# Patient Record
Sex: FEMALE | Race: WHITE | HISPANIC OR LATINO | Employment: FULL TIME | URBAN - METROPOLITAN AREA
[De-identification: names, ages, dates, MRNs, and addresses within clinical notes are randomized per-mention and may not be internally consistent; named-entity substitution may affect disease eponyms.]

---

## 2024-03-26 ENCOUNTER — PHARMACY VISIT (OUTPATIENT)
Dept: PHARMACY | Facility: MEDICAL CENTER | Age: 29
End: 2024-03-26
Payer: COMMERCIAL

## 2024-03-26 ENCOUNTER — APPOINTMENT (OUTPATIENT)
Dept: RADIOLOGY | Facility: MEDICAL CENTER | Age: 29
End: 2024-03-26
Attending: EMERGENCY MEDICINE
Payer: MEDICAID

## 2024-03-26 ENCOUNTER — HOSPITAL ENCOUNTER (EMERGENCY)
Facility: MEDICAL CENTER | Age: 29
End: 2024-03-26
Attending: EMERGENCY MEDICINE
Payer: MEDICAID

## 2024-03-26 VITALS
TEMPERATURE: 98.6 F | DIASTOLIC BLOOD PRESSURE: 66 MMHG | RESPIRATION RATE: 18 BRPM | SYSTOLIC BLOOD PRESSURE: 108 MMHG | OXYGEN SATURATION: 96 % | WEIGHT: 164.46 LBS | HEART RATE: 87 BPM

## 2024-03-26 DIAGNOSIS — N76.0 BACTERIAL VAGINOSIS: ICD-10-CM

## 2024-03-26 DIAGNOSIS — B96.89 BACTERIAL VAGINOSIS: ICD-10-CM

## 2024-03-26 DIAGNOSIS — B37.31 VAGINAL CANDIDA: ICD-10-CM

## 2024-03-26 DIAGNOSIS — O20.0 THREATENED MISCARRIAGE IN EARLY PREGNANCY: ICD-10-CM

## 2024-03-26 LAB
ALBUMIN SERPL BCP-MCNC: 4.4 G/DL (ref 3.2–4.9)
ALBUMIN/GLOB SERPL: 1.7 G/DL
ALP SERPL-CCNC: 59 U/L (ref 30–99)
ALT SERPL-CCNC: 87 U/L (ref 2–50)
ANION GAP SERPL CALC-SCNC: 12 MMOL/L (ref 7–16)
APPEARANCE UR: CLEAR
AST SERPL-CCNC: 53 U/L (ref 12–45)
B-HCG SERPL-ACNC: 7889 MIU/ML (ref 0–5)
BACTERIA #/AREA URNS HPF: ABNORMAL /HPF
BASOPHILS # BLD AUTO: 0.7 % (ref 0–1.8)
BASOPHILS # BLD: 0.04 K/UL (ref 0–0.12)
BILIRUB SERPL-MCNC: 0.4 MG/DL (ref 0.1–1.5)
BILIRUB UR QL STRIP.AUTO: NEGATIVE
BUN SERPL-MCNC: 11 MG/DL (ref 8–22)
C TRACH DNA GENITAL QL NAA+PROBE: NEGATIVE
CALCIUM ALBUM COR SERPL-MCNC: 9 MG/DL (ref 8.5–10.5)
CALCIUM SERPL-MCNC: 9.3 MG/DL (ref 8.5–10.5)
CANDIDA DNA VAG QL PROBE+SIG AMP: POSITIVE
CHLORIDE SERPL-SCNC: 107 MMOL/L (ref 96–112)
CO2 SERPL-SCNC: 20 MMOL/L (ref 20–33)
COLOR UR: YELLOW
CREAT SERPL-MCNC: 0.58 MG/DL (ref 0.5–1.4)
EOSINOPHIL # BLD AUTO: 0.13 K/UL (ref 0–0.51)
EOSINOPHIL NFR BLD: 2.3 % (ref 0–6.9)
EPI CELLS #/AREA URNS HPF: ABNORMAL /HPF
ERYTHROCYTE [DISTWIDTH] IN BLOOD BY AUTOMATED COUNT: 39.4 FL (ref 35.9–50)
G VAGINALIS DNA VAG QL PROBE+SIG AMP: POSITIVE
GFR SERPLBLD CREATININE-BSD FMLA CKD-EPI: 125 ML/MIN/1.73 M 2
GLOBULIN SER CALC-MCNC: 2.6 G/DL (ref 1.9–3.5)
GLUCOSE SERPL-MCNC: 79 MG/DL (ref 65–99)
GLUCOSE UR STRIP.AUTO-MCNC: NEGATIVE MG/DL
HCT VFR BLD AUTO: 38.3 % (ref 37–47)
HGB BLD-MCNC: 13.6 G/DL (ref 12–16)
HYALINE CASTS #/AREA URNS LPF: ABNORMAL /LPF
IMM GRANULOCYTES # BLD AUTO: 0.02 K/UL (ref 0–0.11)
IMM GRANULOCYTES NFR BLD AUTO: 0.3 % (ref 0–0.9)
KETONES UR STRIP.AUTO-MCNC: NEGATIVE MG/DL
LEUKOCYTE ESTERASE UR QL STRIP.AUTO: ABNORMAL
LIPASE SERPL-CCNC: 41 U/L (ref 11–82)
LYMPHOCYTES # BLD AUTO: 1.62 K/UL (ref 1–4.8)
LYMPHOCYTES NFR BLD: 28.3 % (ref 22–41)
MCH RBC QN AUTO: 32.1 PG (ref 27–33)
MCHC RBC AUTO-ENTMCNC: 35.5 G/DL (ref 32.2–35.5)
MCV RBC AUTO: 90.3 FL (ref 81.4–97.8)
MICRO URNS: ABNORMAL
MONOCYTES # BLD AUTO: 0.57 K/UL (ref 0–0.85)
MONOCYTES NFR BLD AUTO: 10 % (ref 0–13.4)
N GONORRHOEA DNA GENITAL QL NAA+PROBE: NEGATIVE
NEUTROPHILS # BLD AUTO: 3.34 K/UL (ref 1.82–7.42)
NEUTROPHILS NFR BLD: 58.4 % (ref 44–72)
NITRITE UR QL STRIP.AUTO: NEGATIVE
NRBC # BLD AUTO: 0 K/UL
NRBC BLD-RTO: 0 /100 WBC (ref 0–0.2)
NUMBER OF RH DOSES IND 8505RD: NORMAL
PH UR STRIP.AUTO: 5 [PH] (ref 5–8)
PLATELET # BLD AUTO: 247 K/UL (ref 164–446)
PMV BLD AUTO: 9.7 FL (ref 9–12.9)
POTASSIUM SERPL-SCNC: 3.7 MMOL/L (ref 3.6–5.5)
PROT SERPL-MCNC: 7 G/DL (ref 6–8.2)
PROT UR QL STRIP: NEGATIVE MG/DL
RBC # BLD AUTO: 4.24 M/UL (ref 4.2–5.4)
RBC # URNS HPF: ABNORMAL /HPF
RBC UR QL AUTO: ABNORMAL
RH BLD: NORMAL
SODIUM SERPL-SCNC: 139 MMOL/L (ref 135–145)
SP GR UR STRIP.AUTO: 1.02
SPECIMEN SOURCE: NORMAL
T PALLIDUM AB SER QL IA: NORMAL
T VAGINALIS DNA VAG QL PROBE+SIG AMP: NEGATIVE
UROBILINOGEN UR STRIP.AUTO-MCNC: 0.2 MG/DL
WBC # BLD AUTO: 5.7 K/UL (ref 4.8–10.8)
WBC #/AREA URNS HPF: ABNORMAL /HPF

## 2024-03-26 PROCEDURE — 87510 GARDNER VAG DNA DIR PROBE: CPT

## 2024-03-26 PROCEDURE — 86780 TREPONEMA PALLIDUM: CPT

## 2024-03-26 PROCEDURE — 87491 CHLMYD TRACH DNA AMP PROBE: CPT

## 2024-03-26 PROCEDURE — 99284 EMERGENCY DEPT VISIT MOD MDM: CPT | Mod: EDC

## 2024-03-26 PROCEDURE — 85025 COMPLETE CBC W/AUTO DIFF WBC: CPT

## 2024-03-26 PROCEDURE — 83690 ASSAY OF LIPASE: CPT

## 2024-03-26 PROCEDURE — 36415 COLL VENOUS BLD VENIPUNCTURE: CPT | Mod: EDC

## 2024-03-26 PROCEDURE — 80053 COMPREHEN METABOLIC PANEL: CPT

## 2024-03-26 PROCEDURE — 87660 TRICHOMONAS VAGIN DIR PROBE: CPT

## 2024-03-26 PROCEDURE — 86901 BLOOD TYPING SEROLOGIC RH(D): CPT

## 2024-03-26 PROCEDURE — 87591 N.GONORRHOEAE DNA AMP PROB: CPT

## 2024-03-26 PROCEDURE — 87480 CANDIDA DNA DIR PROBE: CPT

## 2024-03-26 PROCEDURE — 84702 CHORIONIC GONADOTROPIN TEST: CPT

## 2024-03-26 PROCEDURE — 81001 URINALYSIS AUTO W/SCOPE: CPT

## 2024-03-26 PROCEDURE — RXMED WILLOW AMBULATORY MEDICATION CHARGE: Performed by: EMERGENCY MEDICINE

## 2024-03-26 PROCEDURE — 76801 OB US < 14 WKS SINGLE FETUS: CPT

## 2024-03-26 RX ORDER — METRONIDAZOLE 500 MG/1
500 TABLET ORAL 2 TIMES DAILY
Qty: 14 TABLET | Refills: 0 | Status: ACTIVE | OUTPATIENT
Start: 2024-03-26 | End: 2024-04-02

## 2024-03-26 NOTE — ED NOTES
This RN chaperoned ERP's pelvic exam.  Swabs collected by ERP and sent to lab.  Patient verified correct patient name and  on labeled specimen and were informed of estimated lab result wait times, verbalized understanding.

## 2024-03-26 NOTE — ED TRIAGE NOTES
Pt to triage .  Chief Complaint   Patient presents with    Vaginal Bleeding     Pt c/o vaginal bleeding since yesterday. Pt states she is approx 8 wks pregnant    Pregnancy

## 2024-03-26 NOTE — ED NOTES
Venipuncture to patient's right AC.  Blood collected and sent to lab.  Patient verified correct patient name and  on labeled specimen and was informed of estimated result wait times, verbalizes understanding.

## 2024-03-26 NOTE — ED NOTES
Emily Vera has been discharged from the Emergency Room.    Discharge instructions, which include signs and symptoms to monitor patient for, as well as detailed information regarding threatened miscarriage, yeast infection, and bacterial vaginosis provided.  All questions and concerns addressed at this time.      Prescription for flagyl and micotin provided to patient. Education provided on proper administration.   Follow up with gynecology encouraged. Dr. Beach's phone number and office location provided.   Patient leaves ER in no apparent distress. This RN provided education regarding returning to the ER for any new concerns or changes in patient's condition.      /66   Pulse 87   Temp 36.7 °C (98 °F) (Temporal)   Resp 16   Wt 74.6 kg (164 lb 7.4 oz)   LMP 01/28/2024 (Approximate)   SpO2 96%

## 2024-03-26 NOTE — ED NOTES
Bedside report received from Deidre CASTILLO. Pt resting on gurney comfortably, denies any needs. Call light is in reach.

## 2024-03-26 NOTE — ED PROVIDER NOTES
ED Provider Note    CHIEF COMPLAINT  Chief Complaint   Patient presents with    Vaginal Bleeding     Pt c/o vaginal bleeding since yesterday. Pt states she is approx 8 wks pregnant    Pregnancy       EXTERNAL RECORDS REVIEWED  Other no other visits noted    HPI/ROS  LIMITATION TO HISTORY   Select: : None  OUTSIDE HISTORIAN(S):  none    Emily Rukhsana Vera is a 29 y.o. female who presents with lower abdominal cramping and vaginal bleeding.  Patient reports she is around 8 weeks pregnant by dates, yesterday after doing some light weight lifting she reports she started having some lower abdominal cramping followed by vaginal bleeding.  This has continued into today although the bleeding is almost resolved at this point.  She reports no other abdominal pain, no other vaginal discharge, no nausea or vomiting, no fevers or chills, no urinary concerns        PAST MEDICAL HISTORY       SURGICAL HISTORY  patient denies any surgical history    FAMILY HISTORY  No family history on file.    SOCIAL HISTORY  Social History     Tobacco Use    Smoking status: Not on file    Smokeless tobacco: Not on file   Substance and Sexual Activity    Alcohol use: Not on file    Drug use: Not on file    Sexual activity: Not on file       CURRENT MEDICATIONS  Home Medications       Reviewed by Haley Alvarez R.N. (Registered Nurse) on 24 at 1130  Med List Status: Partial     Medication Last Dose Status        Patient Mikal Taking any Medications                           ALLERGIES  Allergies   Allergen Reactions    Penicillins      rash       PHYSICAL EXAM  VITAL SIGNS: /61   Pulse 80   Temp 36.7 °C (98 °F) (Temporal)   Resp 16   Wt 74.6 kg (164 lb 7.4 oz)   LMP 2024 (Approximate)   SpO2 97%      Pulse ox interpretation: I interpret this pulse ox as normal.  Constitutional: Alert in no apparent distress.  HENT: No signs of trauma, Bilateral external ears normal, Nose normal.   Eyes: Pupils are equal and  reactive, Conjunctiva normal, Non-icteric.   Neck: Normal range of motion, No tenderness, Supple, No stridor.   Cardiovascular: Regular rate and rhythm, no murmurs.   Thorax & Lungs: Normal breath sounds, No respiratory distress, No wheezing, No chest tenderness.   Abdomen: Bowel sounds normal, Soft, No tenderness, No masses, No pulsatile masses. No peritoneal signs.  :  Skin: Warm, Dry, No erythema, No rash.   Back: No bony tenderness, No CVA tenderness.   Extremities: Intact distal pulses, No edema, No tenderness,   Musculoskeletal: No major deformities noted.   Neurologic: Alert , Normal motor function, Normal sensory function, No focal deficits noted.   Psychiatric: Affect normal, Judgment normal, Mood normal.              DIAGNOSTIC STUDIES / PROCEDURES  Labs Reviewed   COMP METABOLIC PANEL - Abnormal; Notable for the following components:       Result Value    AST(SGOT) 53 (*)     ALT(SGPT) 87 (*)     All other components within normal limits   HCG QUANTITATIVE - Abnormal; Notable for the following components:    Bhcg 7889.0 (*)     All other components within normal limits   URINALYSIS,CULTURE IF INDICATED - Abnormal; Notable for the following components:    Leukocyte Esterase Small (*)     Occult Blood Small (*)     All other components within normal limits   URINE MICROSCOPIC (W/UA) - Abnormal; Notable for the following components:    Bacteria Few (*)     Hyaline Cast 3-5 (*)     All other components within normal limits   BACTERIAL VAGINOSIS/VAGINITIS PANEL - Abnormal; Notable for the following components:    Candida species DNA Probe POSITIVE (*)     Gardnerella vaginalis DNA Probe POSITIVE (*)     All other components within normal limits   CBC WITH DIFFERENTIAL   LIPASE   RH TYPE FOR RHOGAM FROM E.D.   CHLAMYDIA/GC, PCR (GENITAL/ANAL SWAB)   T.PALLIDUM AB NHUNG (SCREENING)   ESTIMATED GFR         RADIOLOGY  I have independently interpreted the diagnostic imaging associated with this visit and am waiting  the final reading from the radiologist.   My preliminary interpretation is as follows: no obvious IUP  Radiologist interpretation:   US-OB 1ST TRIMESTER WITH TRANSVAGINAL (COMBO)   Final Result      1.  Irregularly shaped gestational sac with internal debris in the upper uterine segment with a mean gestational sac diameter of 1.95 cm, corresponding with a gestational age of 7 weeks 0 days and an HALLIE of 11/12/2024.   2.  No yolk sac or fetal pole.   3.  Correlation with serum beta hCG levels and sonographic imaging follow-up in 1 week is recommended.            COURSE & MEDICAL DECISION MAKING      INITIAL ASSESSMENT, COURSE AND PLAN  Care Narrative: 11:45 AM  Patient is evaluated at the bedside and chart is reviewed.  Differential diagnosis considered as below.  Order for diagnostic labs, ultrasound    Patient is reevaluated and updated all results.  Patient is comfortable at this time    PROBLEM LIST  # Threatened miscarriage patient presented with some lower abdominal cramping and vaginal bleeding.  Pelvic exam shows no return of bleeding, has a closed os.  Her ultrasound shows findings with irregularly shaped gestational sac no yolk sac or fetal pole.  No findings of ectopic pregnancy.  She will be provided with outpatient repeat hCG test and she already has a ointment scheduled with her gynecologist in 1 week for follow-up    # Bacterial vaginosis.  Will prescribe Flagyl    # Vaginal candidiasis.  Topical/intravaginal miconazole      DISPOSITION AND DISCUSSIONS    Barriers to care at this time, including but not limited to:  none .     Decision tools and prescription drugs considered including, but not limited to:  Prescriptions as noted above .     The patient will return for new or worsening symptoms and is stable at the time of discharge.    The patient is referred to a primary physician for blood pressure management, diabetic screening, and for all other preventative health  concerns.        DISPOSITION:  Patient will be discharged home in stable condition.    FOLLOW UP:  Bindu Beach M.D.  [address] [place] [id number] [phone]      at your appointment next Thursday      OUTPATIENT MEDICATIONS:  New Prescriptions    METRONIDAZOLE (FLAGYL) 500 MG TAB    Take 1 Tablet by mouth 2 times a day for 7 days.    MICONAZOLE (MICOTIN) 2 % CREAM    Apply 1 Application topically every evening.         FINAL DIAGNOSIS  1. Threatened miscarriage in early pregnancy    2. Bacterial vaginosis    3. Vaginal candida           Electronically signed by: Louis Ruby M.D., 3/26/2024 11:44 AM

## 2024-03-26 NOTE — ED NOTES
First interaction with patient.  Assumed care at this time.  Patient presents to the ER today for complaint of intermittent abdominal cramping and intermittent vaginal bleeding/spotting since yesterday.  She reports that she is currently 8 weeks pregnant, .  She reports that her pain started while performing a deadlift at the gym.  Her pain is in her suprapubic region and radiates to her back.  Her first OBGYN appointment is next week.  Gown provided, patient instructed to changed prior to ERP evaluation.  NPO status explained by this RN.  Call light provided.  Chart up for ERP.

## 2024-03-26 NOTE — DISCHARGE INSTRUCTIONS
As we discussed you do need to have a repeat blood test in 1 week prior to your gynecology visits.  You can do this at one of your labs.  Please take the prescriptions as directed

## 2024-03-26 NOTE — ED NOTES
Urine collected and sent to lab.  Patient verified correct patient name and  on labeled specimen and were informed of estimated lab result wait times, verbalized understanding.

## 2024-04-03 ENCOUNTER — HOSPITAL ENCOUNTER (EMERGENCY)
Facility: MEDICAL CENTER | Age: 29
End: 2024-04-03
Attending: EMERGENCY MEDICINE
Payer: MEDICAID

## 2024-04-03 ENCOUNTER — APPOINTMENT (OUTPATIENT)
Dept: RADIOLOGY | Facility: MEDICAL CENTER | Age: 29
End: 2024-04-03
Attending: EMERGENCY MEDICINE
Payer: MEDICAID

## 2024-04-03 VITALS
SYSTOLIC BLOOD PRESSURE: 120 MMHG | DIASTOLIC BLOOD PRESSURE: 74 MMHG | WEIGHT: 163.14 LBS | RESPIRATION RATE: 15 BRPM | HEIGHT: 62 IN | HEART RATE: 83 BPM | BODY MASS INDEX: 30.02 KG/M2 | OXYGEN SATURATION: 97 % | TEMPERATURE: 97.4 F

## 2024-04-03 DIAGNOSIS — O03.9 SPONTANEOUS MISCARRIAGE: ICD-10-CM

## 2024-04-03 LAB
ALBUMIN SERPL BCP-MCNC: 4.4 G/DL (ref 3.2–4.9)
ALBUMIN/GLOB SERPL: 1.7 G/DL
ALP SERPL-CCNC: 61 U/L (ref 30–99)
ALT SERPL-CCNC: 91 U/L (ref 2–50)
ANION GAP SERPL CALC-SCNC: 12 MMOL/L (ref 7–16)
APPEARANCE UR: CLEAR
AST SERPL-CCNC: 40 U/L (ref 12–45)
B-HCG SERPL-ACNC: 1567 MIU/ML (ref 0–5)
BACTERIA #/AREA URNS HPF: NEGATIVE /HPF
BASOPHILS # BLD AUTO: 0.5 % (ref 0–1.8)
BASOPHILS # BLD: 0.05 K/UL (ref 0–0.12)
BILIRUB SERPL-MCNC: 0.4 MG/DL (ref 0.1–1.5)
BILIRUB UR QL STRIP.AUTO: NEGATIVE
BUN SERPL-MCNC: 8 MG/DL (ref 8–22)
CALCIUM ALBUM COR SERPL-MCNC: 9.2 MG/DL (ref 8.5–10.5)
CALCIUM SERPL-MCNC: 9.5 MG/DL (ref 8.5–10.5)
CHLORIDE SERPL-SCNC: 105 MMOL/L (ref 96–112)
CO2 SERPL-SCNC: 20 MMOL/L (ref 20–33)
COLOR UR: YELLOW
CREAT SERPL-MCNC: 0.49 MG/DL (ref 0.5–1.4)
EOSINOPHIL # BLD AUTO: 0.13 K/UL (ref 0–0.51)
EOSINOPHIL NFR BLD: 1.4 % (ref 0–6.9)
EPI CELLS #/AREA URNS HPF: NEGATIVE /HPF
ERYTHROCYTE [DISTWIDTH] IN BLOOD BY AUTOMATED COUNT: 39.6 FL (ref 35.9–50)
GFR SERPLBLD CREATININE-BSD FMLA CKD-EPI: 131 ML/MIN/1.73 M 2
GLOBULIN SER CALC-MCNC: 2.6 G/DL (ref 1.9–3.5)
GLUCOSE SERPL-MCNC: 89 MG/DL (ref 65–99)
GLUCOSE UR STRIP.AUTO-MCNC: NEGATIVE MG/DL
HCT VFR BLD AUTO: 38.5 % (ref 37–47)
HGB BLD-MCNC: 13.4 G/DL (ref 12–16)
HYALINE CASTS #/AREA URNS LPF: ABNORMAL /LPF
IMM GRANULOCYTES # BLD AUTO: 0.03 K/UL (ref 0–0.11)
IMM GRANULOCYTES NFR BLD AUTO: 0.3 % (ref 0–0.9)
KETONES UR STRIP.AUTO-MCNC: NEGATIVE MG/DL
LEUKOCYTE ESTERASE UR QL STRIP.AUTO: NEGATIVE
LYMPHOCYTES # BLD AUTO: 1.92 K/UL (ref 1–4.8)
LYMPHOCYTES NFR BLD: 21 % (ref 22–41)
MCH RBC QN AUTO: 31.7 PG (ref 27–33)
MCHC RBC AUTO-ENTMCNC: 34.8 G/DL (ref 32.2–35.5)
MCV RBC AUTO: 91 FL (ref 81.4–97.8)
MICRO URNS: ABNORMAL
MONOCYTES # BLD AUTO: 0.79 K/UL (ref 0–0.85)
MONOCYTES NFR BLD AUTO: 8.7 % (ref 0–13.4)
NEUTROPHILS # BLD AUTO: 6.21 K/UL (ref 1.82–7.42)
NEUTROPHILS NFR BLD: 68.1 % (ref 44–72)
NITRITE UR QL STRIP.AUTO: NEGATIVE
NRBC # BLD AUTO: 0.02 K/UL
NRBC BLD-RTO: 0.2 /100 WBC (ref 0–0.2)
NUMBER OF RH DOSES IND 8505RD: NORMAL
PH UR STRIP.AUTO: 5 [PH] (ref 5–8)
PLATELET # BLD AUTO: 289 K/UL (ref 164–446)
PMV BLD AUTO: 9.9 FL (ref 9–12.9)
POTASSIUM SERPL-SCNC: 3.8 MMOL/L (ref 3.6–5.5)
PROT SERPL-MCNC: 7 G/DL (ref 6–8.2)
PROT UR QL STRIP: NEGATIVE MG/DL
RBC # BLD AUTO: 4.23 M/UL (ref 4.2–5.4)
RBC # URNS HPF: ABNORMAL /HPF
RBC UR QL AUTO: ABNORMAL
RH BLD: NORMAL
SODIUM SERPL-SCNC: 137 MMOL/L (ref 135–145)
SP GR UR STRIP.AUTO: 1.02
UROBILINOGEN UR STRIP.AUTO-MCNC: 0.2 MG/DL
WBC # BLD AUTO: 9.1 K/UL (ref 4.8–10.8)
WBC #/AREA URNS HPF: ABNORMAL /HPF

## 2024-04-03 PROCEDURE — 84702 CHORIONIC GONADOTROPIN TEST: CPT

## 2024-04-03 PROCEDURE — 99284 EMERGENCY DEPT VISIT MOD MDM: CPT

## 2024-04-03 PROCEDURE — 86901 BLOOD TYPING SEROLOGIC RH(D): CPT

## 2024-04-03 PROCEDURE — 81001 URINALYSIS AUTO W/SCOPE: CPT

## 2024-04-03 PROCEDURE — 76817 TRANSVAGINAL US OBSTETRIC: CPT

## 2024-04-03 PROCEDURE — 36415 COLL VENOUS BLD VENIPUNCTURE: CPT

## 2024-04-03 PROCEDURE — 85025 COMPLETE CBC W/AUTO DIFF WBC: CPT

## 2024-04-03 PROCEDURE — 80053 COMPREHEN METABOLIC PANEL: CPT

## 2024-04-03 ASSESSMENT — FIBROSIS 4 INDEX: FIB4 SCORE: 0.67

## 2024-04-03 NOTE — ED NOTES
Pt ambulates to yellow 67 with steady gait    ABCs intact. A+Ox4. Pt ambulates to restroom to collect UA.

## 2024-04-03 NOTE — ED PROVIDER NOTES
"ED Provider Note    CHIEF COMPLAINT  Chief Complaint   Patient presents with    Vaginal Bleeding     Began last night, pregnancy 9 weeks, bleeding and clot discharge, , at ER on 3/26 for vaginal bleeding, no appointment with OB/GYN yet     ALEX/RENATO Bowermana Vera is a 29 y.o. female who returns with vaginal bleeding in the setting of early pregnancy.  G3, P0, currently at 9 weeks.  Was evaluated here in the emergency department about a week ago or so and had an ultrasound revealing an irregularly shaped gestational sac.  She also was diagnosed with bacterial vaginosis and vaginal candidiasis, prescribed Flagyl and miconazole.  She has had spotting since but then experienced a heavy episode of cramping and bleeding with clots.  Comes in today concerned that she had a miscarriage.  Has an appoint tomorrow morning with her gynecologist.    PAST MEDICAL HISTORY       SURGICAL HISTORY  patient denies any surgical history    FAMILY HISTORY  History reviewed. No pertinent family history.    SOCIAL HISTORY  Social History     Tobacco Use    Smoking status: Never    Smokeless tobacco: Never   Vaping Use    Vaping Use: Never used   Substance and Sexual Activity    Alcohol use: Not Currently    Drug use: Never    Sexual activity: Not on file       CURRENT MEDICATIONS  Home Medications       Reviewed by Ashlee Watts R.N. (Registered Nurse) on 24 at 1250  Med List Status: Partial     Medication Last Dose Status   miconazole (MICOTIN) 2 % Cream  Active                    ALLERGIES  Allergies   Allergen Reactions    Penicillins      rash       PHYSICAL EXAM  VITAL SIGNS: /64   Pulse 74   Temp 36.6 °C (97.8 °F) (Temporal)   Resp 16   Ht 1.575 m (5' 2\")   Wt 74 kg (163 lb 2.3 oz)   LMP 2024 (Approximate)   SpO2 97%   BMI 29.84 kg/m²    Constitutional: Alert in no apparent distress.  HENT: No signs of significant acute trauma.   Eyes: Conjunctiva normal, non-icteric.   Chest: Normal " nonlabored respirations  Abdomen: Nondistended, minimal lower tenderness  Skin: No appreciable rash on the exposed skin  Musculoskeletal: No obvious acute trauma appreciated  Neurologic: Alert, no obvious focal deficits noted.        EKG/LABS  Results for orders placed or performed during the hospital encounter of 04/03/24   CBC WITH DIFFERENTIAL   Result Value Ref Range    WBC 9.1 4.8 - 10.8 K/uL    RBC 4.23 4.20 - 5.40 M/uL    Hemoglobin 13.4 12.0 - 16.0 g/dL    Hematocrit 38.5 37.0 - 47.0 %    MCV 91.0 81.4 - 97.8 fL    MCH 31.7 27.0 - 33.0 pg    MCHC 34.8 32.2 - 35.5 g/dL    RDW 39.6 35.9 - 50.0 fL    Platelet Count 289 164 - 446 K/uL    MPV 9.9 9.0 - 12.9 fL    Neutrophils-Polys 68.10 44.00 - 72.00 %    Lymphocytes 21.00 (L) 22.00 - 41.00 %    Monocytes 8.70 0.00 - 13.40 %    Eosinophils 1.40 0.00 - 6.90 %    Basophils 0.50 0.00 - 1.80 %    Immature Granulocytes 0.30 0.00 - 0.90 %    Nucleated RBC 0.20 0.00 - 0.20 /100 WBC    Neutrophils (Absolute) 6.21 1.82 - 7.42 K/uL    Lymphs (Absolute) 1.92 1.00 - 4.80 K/uL    Monos (Absolute) 0.79 0.00 - 0.85 K/uL    Eos (Absolute) 0.13 0.00 - 0.51 K/uL    Baso (Absolute) 0.05 0.00 - 0.12 K/uL    Immature Granulocytes (abs) 0.03 0.00 - 0.11 K/uL    NRBC (Absolute) 0.02 K/uL   COMP METABOLIC PANEL   Result Value Ref Range    Sodium 137 135 - 145 mmol/L    Potassium 3.8 3.6 - 5.5 mmol/L    Chloride 105 96 - 112 mmol/L    Co2 20 20 - 33 mmol/L    Anion Gap 12.0 7.0 - 16.0    Glucose 89 65 - 99 mg/dL    Bun 8 8 - 22 mg/dL    Creatinine 0.49 (L) 0.50 - 1.40 mg/dL    Calcium 9.5 8.5 - 10.5 mg/dL    Correct Calcium 9.2 8.5 - 10.5 mg/dL    AST(SGOT) 40 12 - 45 U/L    ALT(SGPT) 91 (H) 2 - 50 U/L    Alkaline Phosphatase 61 30 - 99 U/L    Total Bilirubin 0.4 0.1 - 1.5 mg/dL    Albumin 4.4 3.2 - 4.9 g/dL    Total Protein 7.0 6.0 - 8.2 g/dL    Globulin 2.6 1.9 - 3.5 g/dL    A-G Ratio 1.7 g/dL   RH TYPE FOR RHOGAM FROM E.D.   Result Value Ref Range    Emergency Department Rh Typing POS      Number Of Rh Doses Indicated ZERO    HCG QUANTITATIVE   Result Value Ref Range    Bhcg 1567.0 (H) 0.0 - 5.0 mIU/mL   URINALYSIS,CULTURE IF INDICATED    Specimen: Urine   Result Value Ref Range    Color Yellow     Character Clear     Specific Gravity 1.023 <1.035    Ph 5.0 5.0 - 8.0    Glucose Negative Negative mg/dL    Ketones Negative Negative mg/dL    Protein Negative Negative mg/dL    Bilirubin Negative Negative    Urobilinogen, Urine 0.2 Negative    Nitrite Negative Negative    Leukocyte Esterase Negative Negative    Occult Blood Moderate (A) Negative    Micro Urine Req Microscopic    ESTIMATED GFR   Result Value Ref Range    GFR (CKD-EPI) 131 >60 mL/min/1.73 m 2   URINE MICROSCOPIC (W/UA)   Result Value Ref Range    WBC 0-2 /hpf    RBC 20-50 (A) /hpf    Bacteria Negative None /hpf    Epithelial Cells Negative /hpf    Hyaline Cast 0-2 /lpf     I have independently interpreted this EKG    RADIOLOGY  I have independently interpreted the diagnostic imaging associated with this visit and am waiting the final reading from the radiologist.   My preliminary interpretation is as follows: No IUP    Radiologist interpretation:  US-OB TRANSVAGINAL ONLY   Final Result         No intrauterine pregnancy identified. The differential includes normal early intrauterine pregnancy. Spontaneous  or ectopic pregnancy cannot be excluded.      Continued follow-up imaging and serial beta hCG is recommended.          COURSE & MEDICAL DECISION MAKING    ASSESSMENT, COURSE AND PLAN  Care Narrative: This is a 29-year-old female who comes in with heavier bleeding and pelvic cramping after being diagnosed about a week ago with a threatened miscarriage, today her blood work reveals a significantly decreased hCG and a now empty uterus.  This is a miscarriage.  She appears well otherwise.  Has an appointment tomorrow morning with gynecology.  Discharged home in stable condition.      FINAL DIAGNOSIS  1. Spontaneous miscarriage            Electronically signed by: Jossue Marvin M.D., 4/3/2024 3:03 PM

## 2024-04-03 NOTE — ED TRIAGE NOTES
"Chief Complaint   Patient presents with    Vaginal Bleeding     Began last night, pregnancy 9 weeks, bleeding and clot discharge, , at ER on 3/26 for vaginal bleeding, no appointment with OB/GYN yet        Ambulated to triage for above complaint.     Vaginal bleeding protocols ordered. Pt brought to [facility] office for blood draw. UA given. Pt educated of triage process and informed to contact staff if situation changes.    /69   Pulse 92   Temp 36.6 °C (97.8 °F) (Temporal)   Resp 18   Ht 1.575 m (5' 2\")   Wt 74 kg (163 lb 2.3 oz)   LMP 2024 (Approximate)   SpO2 98%   BMI 29.84 kg/m²      "

## 2024-05-19 ENCOUNTER — HOSPITAL ENCOUNTER (EMERGENCY)
Facility: MEDICAL CENTER | Age: 29
End: 2024-05-19
Attending: EMERGENCY MEDICINE
Payer: MEDICAID

## 2024-05-19 VITALS
DIASTOLIC BLOOD PRESSURE: 65 MMHG | OXYGEN SATURATION: 96 % | HEART RATE: 89 BPM | HEIGHT: 62 IN | BODY MASS INDEX: 29.53 KG/M2 | WEIGHT: 160.5 LBS | SYSTOLIC BLOOD PRESSURE: 105 MMHG | TEMPERATURE: 98.5 F | RESPIRATION RATE: 16 BRPM

## 2024-05-19 DIAGNOSIS — F31.63 BIPOLAR DISORDER, CURRENT EPISODE MIXED, SEVERE, UNSPECIFIED WHETHER PSYCHOTIC FEATURES (HCC): ICD-10-CM

## 2024-05-19 DIAGNOSIS — R45.851 SUICIDAL IDEATION: ICD-10-CM

## 2024-05-19 LAB — POC BREATHALIZER: 0 PERCENT (ref 0–0.01)

## 2024-05-19 RX ORDER — HYDROXYZINE HYDROCHLORIDE 25 MG/1
50 TABLET, FILM COATED ORAL ONCE
Status: COMPLETED | OUTPATIENT
Start: 2024-05-19 | End: 2024-05-19

## 2024-05-19 RX ADMIN — HYDROXYZINE HYDROCHLORIDE 50 MG: 25 TABLET, FILM COATED ORAL at 13:58

## 2024-05-19 ASSESSMENT — FIBROSIS 4 INDEX: FIB4 SCORE: 0.42

## 2024-05-19 NOTE — ED NOTES
Sister, Gina, brought pt to ED. She was allowed back to check in with patient to reassure her and go over some logistics of her hospitalization.     Phone number [phone].       Her sister Sondra is primary contact [phone]

## 2024-05-19 NOTE — DISCHARGE PLANNING
Alert Team/Behavioral Health   Note:      [facility] TEAM DISCHARGE PLANNING NOTE    Date:5/19/24  Patient Name:  Emily Vera - 29 y.o. - Discharge Planning  MRN:  [id number]   YOB: 1995  ADMISSION DATE:  5/19/2024     Writer forwarded referral packet for inpatient psychiatric care to the following community providers: [facility], [facility] , [facility]     Items included in the referral packet:   _x____Face Sheet   _x____Pages 1 and 2 of completed legal hold   _x____Alert Team/Psych Assessment   _x____H&P   _____UDS   _x____Blood Alcohol   _x____Vital signs   _____Pregnancy Test (if applicable)   _x____Medications List   _____Covid Screen
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
## 2024-05-19 NOTE — ED PROVIDER NOTES
ER Provider Note    Scribed for Gareth Raya M.D. by Sabrina Martinez. 5/19/2024   1:24 PM    Primary Care Provider: None noted    CHIEF COMPLAINT  Chief Complaint   Patient presents with    Suicidal Ideation     Ambulates to triage endorses SI with plan to cut wrist. Has history of bipolar and borderline personality disorder. Off of her psychiatric medications x 1 year. Tearful in triage.     EXTERNAL RECORDS REVIEWED  The patient was last here on 4/3/24 for a miscarriage.    HPI/ROS    OUTSIDE HISTORIAN(S):  Sister    Emily Vera is a 29 y.o. female with a history of bipolar and borderline personality disorder who presents to the ED for evaluation of suicidal ideation. The patient states she has not been taking her medications and is having intermittent episodes of psychiatric complaints. She notes she recently had a miscarriage which triggered this episode of suicidal ideations. She reports difficulty sleeping and that she told her family last night she was going to kill herself. The patient has her sister here in town who is supportive. She states she has previously taken Lamictal, Hydroxyzine and Lexapro. She adds she also took a Benzo as needed but does not know the name. She is concerned as some of these medications have made her gain weight but were effective at calming her hallucinations. She states when she is manic she cheats on her boyfriend and takes arash.     PAST MEDICAL HISTORY  None noted    SURGICAL HISTORY  None noted    FAMILY HISTORY  None noted    SOCIAL HISTORY   reports that she has never smoked. She has never used smokeless tobacco. She reports that she does not currently use alcohol. She reports that she does not use drugs.    CURRENT MEDICATIONS  Previous Medications    MICONAZOLE (MICOTIN) 2 % CREAM    Apply 1 Application topically every evening.       ALLERGIES  Allergies   Allergen Reactions    Penicillins      rash        PHYSICAL EXAM  /83   Pulse (!) 106    "Temp 37.2 °C (98.9 °F) (Temporal)   Resp 16   Ht 1.575 m (5' 2\")   Wt 72.8 kg (160 lb 7.9 oz)   LMP 04/19/2024 (Approximate)   SpO2 95%   Breastfeeding Unknown   BMI 29.35 kg/m²    Nursing note and vitals reviewed.  Constitutional: Well-developed and well-nourished.  Depressed, tearful, anxious   HENT: Head is normocephalic and atraumatic. Oropharynx is clear and moist without exudate or erythema.   Eyes: Pupils are equal, round, and reactive to light. Conjunctiva are normal.   Cardiovascular: Normal rate and regular rhythm. No murmur heard. Normal radial pulses.  Pulmonary/Chest: Breath sounds normal. No wheezes or rales.   Abdominal: Soft and non-tender. No distention    Musculoskeletal: Extremities exhibit normal range of motion without edema or tenderness.   Neurological: Awake, alert and oriented to person, place, and time. No focal deficits noted.  Skin: Skin is warm and dry. No rash.   Psychiatric: tearful depressed, confirms SI    DIAGNOSTIC STUDIES    Labs:   Results for orders placed or performed during the hospital encounter of 05/19/24   POC BREATHALIZER   Result Value Ref Range    POC Breathalizer 0.00 0.00 - 0.01 Percent     INITIAL ASSESSMENT AND PLAN    1:24 PM - Patient was evaluated at bedside for suicidal. Ordered for UDS and POC Breathalizer to evaluate. Patient verbalizes understanding and support with my plan of care.  The patient will be observed pending psychiatric evaluation.     ED OBS: Yes; I am placing the patient in to an observation status due to a diagnostic uncertainty as well as therapeutic intensity. Patient placed in observation status at 1:24 PM, 5/19/2024.     Observation plan is as follows: Patient will be observed in the emergency department, seen by behavioral health.    Patient was seen by behavioral health.  We both agree that this patient would benefit from inpatient psychiatric treatment.  Medically clear for transfer.  Legal 2000 initiated.    FINAL DIAGNOSIS  1. " Suicidal ideation    2. Bipolar disorder, current episode mixed, severe, unspecified whether psychotic features (HCC)         ISabrina (Scribe), am scribing for, and in the presence of, Gareth Raya M.D..    Electronically signed by: Sabrina Martinez (Scribe), 5/19/2024    Gareth GALEANO M.D. personally performed the services described in this documentation, as scribed by Sabrina Martinez in my presence, and it is both accurate and complete.      The note accurately reflects work and decisions made by me.  Gareth Raya M.D.  5/19/2024  3:11 PM

## 2024-05-19 NOTE — ED TRIAGE NOTES
Chief Complaint   Patient presents with    Suicidal Ideation     Ambulates to triage endorses SI with plan to cut wrist. Has history of bipolar and borderline personality disorder. Off of her psychiatric medications x 1 year. Tearful in triage.     Charge notified. Scored HIGH RISK.

## 2024-05-20 NOTE — DISCHARGE PLANNING
Alert Team/Behavioral Health   Note:      Mental Health Transfer    Referral: transfer to Mental Health Facility    Intervention: Intake Counselor (Praneeth) at [facility] called to accept patient.    Patient's accepting provider is Ryan CARMONA.    Transport arranged through [facility] and [facility] at [facility] ambulance.    The patient will be picked up @ 1730.    Notified Attending Provider (Pedro COBB), Bedside RN (Debra NORIEGA), and Alert Team RN (Ankit STODDARD) via Voalte message of the departure time as well as accepting facility.    [facility] PCS Form scanned in .    Transfer packet to be created and placed in chart.    Plan: transfer to [facility] via [facility] ambulance for acute inpatient psychiatric care.